# Patient Record
Sex: MALE | Race: WHITE | NOT HISPANIC OR LATINO | Employment: UNEMPLOYED | URBAN - METROPOLITAN AREA
[De-identification: names, ages, dates, MRNs, and addresses within clinical notes are randomized per-mention and may not be internally consistent; named-entity substitution may affect disease eponyms.]

---

## 2017-05-09 ENCOUNTER — NON-PROVIDER VISIT (OUTPATIENT)
Dept: URGENT CARE | Facility: PHYSICIAN GROUP | Age: 52
End: 2017-05-09

## 2017-05-09 DIAGNOSIS — Z02.1 PRE-EMPLOYMENT DRUG SCREENING: ICD-10-CM

## 2017-05-09 LAB
AMP AMPHETAMINE: NORMAL
COC COCAINE: NORMAL
INT CON NEG: NEGATIVE
INT CON POS: POSITIVE
MET METHAMPHETAMINES: NORMAL
OPI OPIATES: NORMAL
PCP PHENCYCLIDINE: NORMAL
POC DRUG COMMENT 753798-OCCUPATIONAL HEALTH: NEGATIVE
THC: NORMAL

## 2017-05-09 PROCEDURE — 80305 DRUG TEST PRSMV DIR OPT OBS: CPT | Performed by: PHYSICIAN ASSISTANT

## 2017-05-09 NOTE — PATIENT INSTRUCTIONS
Franco Rj Steiner is a 52 y.o. male here for a non-provider visit for uds    If abnormal was an in office provider notified today (if so, indicate provider)? No  Routed to PCP? No

## 2017-05-09 NOTE — MR AVS SNAPSHOT
Franco Steiner   2017 9:00 AM   Non-Provider Visit   MRN: [id number]    Department:  [facility]   Dept Phone:  [phone]    Description:  Male : 1965   [facility]           Reason for Visit     Drug / Alcohol Assessment           Allergies as of 2017     No Known Allergies      You were diagnosed with     Pre-employment drug screening   [[id number]]         Vital Signs     Smoking Status                   Current Every Day Smoker           Basic Information     Date Of Birth Sex Race Ethnicity Preferred Language    1965 Male White Non- English      Health Maintenance        Date Due Completion Dates    IMM DTaP/Tdap/Td Vaccine (1 - Tdap) 1/15/1985 ---    COLONOSCOPY 1/15/2016 ---            Results     POCT 6 Panel Urine Drug Screen      Component    AMPHETAMINE    POC THC    COCAINE    OPIATES    PHENCYCLIDINE    METHAMPHETAMINES    POC Urine Drug Screen Comment    negative    Internal Control Positive    Positive    Internal Control Negative    Negative                        Current Immunizations     No immunizations on file.      Below and/or attached are the medications your provider expects you to take. Review all of your home medications and newly ordered medications with your provider and/or pharmacist. Follow medication instructions as directed by your provider and/or pharmacist. Please keep your medication list with you and share with your provider. Update the information when medications are discontinued, doses are changed, or new medications (including over-the-counter products) are added; and carry medication information at all times in the event of emergency situations     Allergies:  No Known Allergies          Medications  Valid as of: May 09, 2017 -  1:34 PM    Generic Name Brand Name Tablet Size Instructions for use    Acetaminophen-Codeine (Tab) Acetaminophen-Codeine 300-30 MG Take 1-2 Tabs by mouth every 6 hours as needed.        Azithromycin (Tab) ZITHROMAX 250 MG TAKE TWO TABLETS BY MOUTH AS ONE DOSE ON THE FIRST DAY THEN TAKE ONE TABLET DAILY THEREAFTER        MethylPREDNISolone (Kit) MEDROL DOSEPACK 4 MG follow package directions        .                 Medicines prescribed today were sent to:     [id number] - [place] [address]    Phone: [phone] Fax: [phone]    Open 24 Hours?: No      Medication refill instructions:       If your prescription bottle indicates you have medication refills left, it is not necessary to call your provider’s office. Please contact your pharmacy and they will refill your medication.    If your prescription bottle indicates you do not have any refills left, you may request refills at any time through one of the following ways: The online [facility] [facility] system (except Urgent Care), by calling your provider’s office, or by asking your pharmacy to contact your provider’s office with a refill request. Medication refills are processed only during regular business hours and may not be available until the next business day. Your provider may request additional information or to have a follow-up visit with you prior to refilling your medication.   *Please Note: Medication refills are assigned a new Rx number when refilled electronically. Your pharmacy may indicate that no refills were authorized even though a new prescription for the same medication is available at the pharmacy. Please request the medicine by name with the pharmacy before contacting your provider for a refill.        Instructions    Franco Steiner is a 52 y.o. male here for a non-provider visit for uds    If abnormal was an in office provider notified today (if so, indicate provider)? No  Routed to PCP? No            [facility] [facility] Access Code: [email or web address]  Expires: 6/8/2017  1:34 PM    Your email address is not on file at [employer].  Email Addresses are required for you to sign up for [facility],  please contact [phone] to verify your personal information and to provide your email address prior to attempting to register for [employer].    Franco Steiner  [address] [place] [address]    [employer]  A secure, online tool to manage your health information     [employer]® is a secure, online tool that connects you to your personalized health information from the privacy of your home -- day or night - making it very easy for you to manage your healthcare. Once the activation process is completed, you can even access your medical information using the [employer] collin, which is available for free in the Apple Collin store or Google Play store.     To learn more about [employer], visit www.The Smart[employer] Physical/Arte Manifiestot    There are two levels of access available (as shown below):   My Chart Features  [facility] Primary Care Doctor [facility]  Specialists [facility]  Urgent  Care Non-[facility] Primary Care Doctor   Email your healthcare team securely and privately 24/7 X X X    Manage appointments: schedule your next appointment; view details of past/upcoming appointments X      Request prescription refills. X      View recent personal medical records, including lab and immunizations X X X X   View health record, including health history, allergies, medications X X X X   Read reports about your outpatient visits, procedures, consult and ER notes X X X X   See your discharge summary, which is a recap of your hospital and/or ER visit that includes your diagnosis, lab results, and care plan X X  X     How to register for [employer]:  Once your e-mail address has been verified, follow the following steps to sign up for [employer].     1. Go to  [email or web address]  2. Click on the Sign Up Now box, which takes you to the New Member Sign Up page. You will need to provide the following information:  a. Enter your [employer] Access Code exactly as it appears at the top of this page. (You will not need to use this code after you’ve completed the  sign-up process. If you do not sign up before the expiration date, you must request a new code.)   b. Enter your date of birth.   c. Enter your home email address.   d. Click Submit, and follow the next screen’s instructions.  3. Create a [employer] ID. This will be your [employer] login ID and cannot be changed, so think of one that is secure and easy to remember.  4. Create a [employer] password. You can change your password at any time.  5. Enter your Password Reset Question and Answer. This can be used at a later time if you forget your password.   6. Enter your e-mail address. This allows you to receive e-mail notifications when new information is available in [employer].  7. Click Sign Up. You can now view your health information.    For assistance activating your [employer] account, call [phone]

## 2020-05-25 ENCOUNTER — HOSPITAL ENCOUNTER (EMERGENCY)
Facility: MEDICAL CENTER | Age: 55
End: 2020-05-26
Attending: EMERGENCY MEDICINE
Payer: MEDICAID

## 2020-05-25 DIAGNOSIS — Z87.39 HISTORY OF GOUT: ICD-10-CM

## 2020-05-25 DIAGNOSIS — M79.641 RIGHT HAND PAIN: ICD-10-CM

## 2020-05-25 PROCEDURE — 99283 EMERGENCY DEPT VISIT LOW MDM: CPT

## 2020-05-25 SDOH — HEALTH STABILITY: MENTAL HEALTH: HOW MANY STANDARD DRINKS CONTAINING ALCOHOL DO YOU HAVE ON A TYPICAL DAY?: 1 OR 2

## 2020-05-25 SDOH — HEALTH STABILITY: MENTAL HEALTH: HOW OFTEN DO YOU HAVE 6 OR MORE DRINKS ON ONE OCCASION?: NOT ASKED

## 2020-05-25 SDOH — HEALTH STABILITY: MENTAL HEALTH: HOW OFTEN DO YOU HAVE A DRINK CONTAINING ALCOHOL?: 2-4 TIMES A MONTH

## 2020-05-26 ENCOUNTER — APPOINTMENT (OUTPATIENT)
Dept: RADIOLOGY | Facility: MEDICAL CENTER | Age: 55
End: 2020-05-26
Attending: EMERGENCY MEDICINE
Payer: MEDICAID

## 2020-05-26 VITALS
OXYGEN SATURATION: 94 % | RESPIRATION RATE: 17 BRPM | DIASTOLIC BLOOD PRESSURE: 110 MMHG | HEIGHT: 66 IN | TEMPERATURE: 98.5 F | BODY MASS INDEX: 24.11 KG/M2 | HEART RATE: 93 BPM | SYSTOLIC BLOOD PRESSURE: 145 MMHG | WEIGHT: 150 LBS

## 2020-05-26 PROCEDURE — 73130 X-RAY EXAM OF HAND: CPT | Mod: RT

## 2020-05-26 RX ORDER — CEPHALEXIN 500 MG/1
1000 CAPSULE ORAL 3 TIMES DAILY
Qty: 30 CAP | Refills: 0 | Status: SHIPPED | OUTPATIENT
Start: 2020-05-26 | End: 2020-05-31

## 2020-05-26 RX ORDER — INDOMETHACIN 50 MG/1
50 CAPSULE ORAL 3 TIMES DAILY
Qty: 60 CAP | Refills: 0 | Status: SHIPPED | OUTPATIENT
Start: 2020-05-26 | End: 2020-07-12

## 2020-05-26 NOTE — ED PROVIDER NOTES
"ED Provider Note    CHIEF COMPLAINT  Chief Complaint   Patient presents with   • Hand Swelling     moving things from storage and felt possible spider bite. R index, middle finger swelling.        HPI  Franco Steiner is a 55 y.o. male past medical history of gout who presents with right hand swelling.  Patient believes that he had a spider bite to the right hand while moving things 2 days ago.  He endorses pain and swelling which started in the right middle finger and now has extended into the hand.  He denies any associated fevers or vomiting.  He does state that he felt a little lightheaded and he does not know if this is associated with the spider bite.    REVIEW OF SYSTEMS  See HPI for further details.   Positive for right finger and hand swelling and pain  Negative for fevers, vomiting    PAST MEDICAL HISTORY   has a past medical history of Gout and Pacemaker.    SOCIAL HISTORY  Social History     Tobacco Use   • Smoking status: Current Every Day Smoker     Packs/day: 1.00   Substance and Sexual Activity   • Alcohol use: Yes     Frequency: 2-4 times a month     Drinks per session: 1 or 2     Comment: moderately   • Drug use: No   • Sexual activity: Not on file       SURGICAL HISTORY   has a past surgical history that includes pacemaker insertion; knee arthroscopy; amputation, toe; and tonsillectomy.    CURRENT MEDICATIONS  Home Medications    **Home medications have not yet been reviewed for this encounter**         ALLERGIES  No Known Allergies    PHYSICAL EXAM  VITAL SIGNS: /110   Pulse 93   Temp 36.9 °C (98.5 °F) (Temporal)   Resp 17   Ht 1.676 m (5' 6\")   Wt 68 kg (150 lb)   SpO2 94%   BMI 24.21 kg/m²    Constitutional: Nontoxic-appearing older male.  Alert in no apparent distress.  HENT: Normocephalic, Atraumatic. Bilateral external ears normal. Nose normal. Moist mucous membranes.  Neck: Supple, full range of motion.  Eyes: Pupils are equal and reactive. Conjunctiva normal.   Skin: Warm, Dry. " No rash.   Musculoskeletal: Right hand with notable swelling over the right PIP joint which appears more chronic in nature, possibly tophi from gout he has difficulty with flexion at the right third DIP joint which also seems to be chronic.  There is no overlying erythema or warmth.  He has no significant pain with range of motion of the fingers.  Neurologic: Alert and oriented. Moving all extremities spontaneously  Psychiatric: Affect normal, Mood normal. Appears appropriate and not intoxicated.       DIAGNOSTIC STUDIES      RADIOLOGY  Personally reviewed by me  DX-HAND 3+ RIGHT   Final Result      1.  No right 3rd finger fracture or dislocation.      2.  Minor erosive arthritic change of the right 3rd PIP joint.      3.  Severe erosive arthritic change involving the right 2nd and 4th DIP joints.            ED COURSE  Vitals:    05/25/20 2356 05/25/20 2357 05/26/20 0000 05/26/20 0101   BP: (!) 161/107  160/108 145/110   Pulse:  95 94 93   Resp:   18 17   Temp:    36.9 °C (98.5 °F)   TempSrc:    Temporal   SpO2:  96% 95% 94%   Weight:       Height:             Medications administered:  Medications - No data to display        MEDICAL DECISION MAKING  Patient with history of gout who presents with right hand swelling for the last 2 days.  He is hypertensive with otherwise reassuring vital signs on arrival.  Patient was concern for possible spider bite although I do not see any evidence of this on exam.  There is what appears to be chronic bony deformity to the fingers consistent with gout or arthritis.  He has no evidence of overlying erythema or warmth to suggest cellulitis or abscess.  He has good range of motion of his fingers without significant pain to suggest septic arthritis or flexor tenosynovitis.  X-ray shows signs of acute arthritic changes however no signs of fracture or dislocation.  I suspect that patient symptoms are more likely associated with gout flare.  He will be discharged home on indomethacin in  addition to antibiotics in case of mild overlying cellulitis. Patient understands plan of care and strict return precautions for changing or worsening symptoms.        IMPRESSION  (M79.641) Right hand pain  (Z87.39) History of gout    Disposition: Discharge home, stable condition  Results, diagnoses, and treatment options were discussed with the patient and/or family. Patient verbalized understanding of plan of care and strict return precautions prior to discharge.    Patient referred to primary care provider for monitoring and treatment of blood pressure.      Discharge Medication List as of 5/26/2020  1:19 AM      START taking these medications    Details   indomethacin (INDOCIN) 50 MG Cap Take 1 Cap by mouth 3 times a day., Disp-60 Cap,R-0, Print Rx Paper      cephALEXin (KEFLEX) 500 MG Cap Take 2 Caps by mouth 3 times a day for 5 days., Disp-30 Cap,R-0, Print Rx Paper               Electronically signed by: Monae Ward M.D., 5/26/2020 12:42 AM

## 2020-05-26 NOTE — ED TRIAGE NOTES
"Chief Complaint   Patient presents with   • Hand Swelling     moving things from storage and felt possible spider bite. R index, middle finger swelling.      Pt amb to triage with steady gait for above complaint. Pt reports he felt something bite his R hand yesterday and now knuckle on index and middle finger swollen. Reports history of gout but this was different, as is started right after the bite.   Pt is alert and oriented, speaking in full sentences, follows commands and responds appropriately to questions. Resp are even and unlabored. No behavioral indicators of pain.   Pt placed in lobby. Pt educated on triage process. Pt encouraged to alert staff for any changes.    Pulse (!) 107   Temp 36.2 °C (97.2 °F) (Oral)   Resp 20   Ht 1.676 m (5' 6\")   Wt 68 kg (150 lb)   SpO2 95%   BMI 24.21 kg/m²     "

## 2020-05-26 NOTE — ED NOTES
Discharge teaching and paperwork provided regarding arthritis and gout and all questions/concerns answered. VSS, hand assessment stable and PIV removed. Given information regarding abx Rx. Patient discharged to the care of self and ambulated out of the ED.

## 2020-05-26 NOTE — DISCHARGE INSTRUCTIONS
You were seen in the Emergency Department for hand pain.    X-ray shows signs of arthritic changes.  There is no signs of significant infection.    Please use anti-inflammatories and antibiotics as directed    Please follow up with your primary care physician.    Return to the Emergency Department with worsening pain, swelling, redness, or other concerns.

## 2020-06-10 ENCOUNTER — HOSPITAL ENCOUNTER (EMERGENCY)
Facility: MEDICAL CENTER | Age: 55
End: 2020-06-11
Attending: EMERGENCY MEDICINE
Payer: MEDICAID

## 2020-06-10 ENCOUNTER — APPOINTMENT (OUTPATIENT)
Dept: RADIOLOGY | Facility: MEDICAL CENTER | Age: 55
End: 2020-06-10
Attending: EMERGENCY MEDICINE
Payer: MEDICAID

## 2020-06-10 DIAGNOSIS — R06.02 SOB (SHORTNESS OF BREATH): ICD-10-CM

## 2020-06-10 DIAGNOSIS — Z71.89 ADVICE GIVEN ABOUT 2019 NOVEL CORONAVIRUS INFECTION: ICD-10-CM

## 2020-06-10 DIAGNOSIS — I10 HYPERTENSION, UNSPECIFIED TYPE: ICD-10-CM

## 2020-06-10 LAB
ALBUMIN SERPL BCP-MCNC: 4.2 G/DL (ref 3.2–4.9)
ALBUMIN/GLOB SERPL: 1.4 G/DL
ALP SERPL-CCNC: 138 U/L (ref 30–99)
ALT SERPL-CCNC: 49 U/L (ref 2–50)
ANION GAP SERPL CALC-SCNC: 10 MMOL/L (ref 7–16)
AST SERPL-CCNC: 35 U/L (ref 12–45)
BASOPHILS # BLD AUTO: 0.7 % (ref 0–1.8)
BASOPHILS # BLD: 0.13 K/UL (ref 0–0.12)
BILIRUB SERPL-MCNC: 0.4 MG/DL (ref 0.1–1.5)
BUN SERPL-MCNC: 24 MG/DL (ref 8–22)
CALCIUM SERPL-MCNC: 9.6 MG/DL (ref 8.5–10.5)
CHLORIDE SERPL-SCNC: 108 MMOL/L (ref 96–112)
CO2 SERPL-SCNC: 15 MMOL/L (ref 20–33)
CREAT SERPL-MCNC: 1.03 MG/DL (ref 0.5–1.4)
D DIMER PPP IA.FEU-MCNC: 1.41 UG/ML (FEU) (ref 0–0.5)
EOSINOPHIL # BLD AUTO: 0.11 K/UL (ref 0–0.51)
EOSINOPHIL NFR BLD: 0.6 % (ref 0–6.9)
ERYTHROCYTE [DISTWIDTH] IN BLOOD BY AUTOMATED COUNT: 45.3 FL (ref 35.9–50)
GLOBULIN SER CALC-MCNC: 3.1 G/DL (ref 1.9–3.5)
GLUCOSE SERPL-MCNC: 124 MG/DL (ref 65–99)
HCT VFR BLD AUTO: 53.3 % (ref 42–52)
HGB BLD-MCNC: 17 G/DL (ref 14–18)
IMM GRANULOCYTES # BLD AUTO: 0.2 K/UL (ref 0–0.11)
IMM GRANULOCYTES NFR BLD AUTO: 1.1 % (ref 0–0.9)
LIPASE SERPL-CCNC: 60 U/L (ref 11–82)
LYMPHOCYTES # BLD AUTO: 1.3 K/UL (ref 1–4.8)
LYMPHOCYTES NFR BLD: 6.8 % (ref 22–41)
MCH RBC QN AUTO: 28.4 PG (ref 27–33)
MCHC RBC AUTO-ENTMCNC: 31.9 G/DL (ref 33.7–35.3)
MCV RBC AUTO: 89.1 FL (ref 81.4–97.8)
MONOCYTES # BLD AUTO: 0.36 K/UL (ref 0–0.85)
MONOCYTES NFR BLD AUTO: 1.9 % (ref 0–13.4)
NEUTROPHILS # BLD AUTO: 16.89 K/UL (ref 1.82–7.42)
NEUTROPHILS NFR BLD: 88.9 % (ref 44–72)
NRBC # BLD AUTO: 0 K/UL
NRBC BLD-RTO: 0 /100 WBC
PLATELET # BLD AUTO: 375 K/UL (ref 164–446)
PMV BLD AUTO: 9.9 FL (ref 9–12.9)
POTASSIUM SERPL-SCNC: 5.1 MMOL/L (ref 3.6–5.5)
PROT SERPL-MCNC: 7.3 G/DL (ref 6–8.2)
RBC # BLD AUTO: 5.98 M/UL (ref 4.7–6.1)
SODIUM SERPL-SCNC: 133 MMOL/L (ref 135–145)
TROPONIN T SERPL-MCNC: 13 NG/L (ref 6–19)
WBC # BLD AUTO: 19 K/UL (ref 4.8–10.8)

## 2020-06-10 PROCEDURE — A9270 NON-COVERED ITEM OR SERVICE: HCPCS | Performed by: EMERGENCY MEDICINE

## 2020-06-10 PROCEDURE — 83690 ASSAY OF LIPASE: CPT

## 2020-06-10 PROCEDURE — 84484 ASSAY OF TROPONIN QUANT: CPT

## 2020-06-10 PROCEDURE — 99284 EMERGENCY DEPT VISIT MOD MDM: CPT

## 2020-06-10 PROCEDURE — 85379 FIBRIN DEGRADATION QUANT: CPT

## 2020-06-10 PROCEDURE — 700102 HCHG RX REV CODE 250 W/ 637 OVERRIDE(OP): Performed by: EMERGENCY MEDICINE

## 2020-06-10 PROCEDURE — 700111 HCHG RX REV CODE 636 W/ 250 OVERRIDE (IP): Performed by: EMERGENCY MEDICINE

## 2020-06-10 PROCEDURE — 80053 COMPREHEN METABOLIC PANEL: CPT

## 2020-06-10 PROCEDURE — 93005 ELECTROCARDIOGRAM TRACING: CPT

## 2020-06-10 PROCEDURE — 93005 ELECTROCARDIOGRAM TRACING: CPT | Performed by: EMERGENCY MEDICINE

## 2020-06-10 PROCEDURE — 85025 COMPLETE CBC W/AUTO DIFF WBC: CPT

## 2020-06-10 RX ORDER — FAMOTIDINE 20 MG/1
20 TABLET, FILM COATED ORAL ONCE
Status: COMPLETED | OUTPATIENT
Start: 2020-06-10 | End: 2020-06-10

## 2020-06-10 RX ORDER — PREDNISONE 20 MG/1
40 TABLET ORAL ONCE
Status: COMPLETED | OUTPATIENT
Start: 2020-06-10 | End: 2020-06-10

## 2020-06-10 RX ORDER — DIPHENHYDRAMINE HCL 25 MG
25 TABLET ORAL ONCE
Status: COMPLETED | OUTPATIENT
Start: 2020-06-10 | End: 2020-06-10

## 2020-06-10 RX ADMIN — FAMOTIDINE 20 MG: 20 TABLET, FILM COATED ORAL at 22:59

## 2020-06-10 RX ADMIN — PREDNISONE 40 MG: 20 TABLET ORAL at 23:00

## 2020-06-10 RX ADMIN — DIPHENHYDRAMINE HYDROCHLORIDE 25 MG: 25 TABLET ORAL at 22:59

## 2020-06-10 NOTE — LETTER
6/16/2020               Franco Steiner  [address]  [facility] [address]        Dear Franco (MR#[id number])    This letter is to inform you that your COVID-19 test result is NEGATIVE.  This means that the virus that causes COVID-19 was not found in your sample.      A review of your test during your recent visit requires that we notify you of the following:  Your nasal swab was negative for the novel coronavirus (COVID-19). You are encouraged to stay at home until you have had no fever for 72 hours without the use of fever reducing medications and your symptoms are improving. There is no need for further self-quarantine for 14 days for COVID-19 unless otherwise directed by the Health Department.     For any further questions regarding COVID-19, please contact the [facility] at [phone].  Thank you for your cooperation in the matter.    Sincerely,  ED Culture Follow-Up Staff  Arnel Clemons, PharmD, PharmD    [facility], Emergency Department  [address] [place] [address] [id number] [phone] (ED Culture Line)  [phone]

## 2020-06-11 ENCOUNTER — APPOINTMENT (OUTPATIENT)
Dept: RADIOLOGY | Facility: MEDICAL CENTER | Age: 55
End: 2020-06-11
Attending: EMERGENCY MEDICINE
Payer: MEDICAID

## 2020-06-11 VITALS
OXYGEN SATURATION: 91 % | SYSTOLIC BLOOD PRESSURE: 168 MMHG | HEIGHT: 66 IN | RESPIRATION RATE: 18 BRPM | TEMPERATURE: 97.4 F | HEART RATE: 70 BPM | DIASTOLIC BLOOD PRESSURE: 105 MMHG | BODY MASS INDEX: 23.63 KG/M2 | WEIGHT: 147.05 LBS

## 2020-06-11 LAB
COVID ORDER STATUS COVID19: NORMAL
EKG IMPRESSION: NORMAL
TROPONIN T SERPL-MCNC: 10 NG/L (ref 6–19)

## 2020-06-11 PROCEDURE — 700117 HCHG RX CONTRAST REV CODE 255: Performed by: EMERGENCY MEDICINE

## 2020-06-11 PROCEDURE — 302875 HCHG BANDAGE ACE 4 OR 6""

## 2020-06-11 PROCEDURE — 302874 HCHG BANDAGE ACE 2 OR 3""

## 2020-06-11 PROCEDURE — 29125 APPL SHORT ARM SPLINT STATIC: CPT

## 2020-06-11 PROCEDURE — 71275 CT ANGIOGRAPHY CHEST: CPT

## 2020-06-11 PROCEDURE — 84484 ASSAY OF TROPONIN QUANT: CPT

## 2020-06-11 PROCEDURE — C9803 HOPD COVID-19 SPEC COLLECT: HCPCS | Performed by: EMERGENCY MEDICINE

## 2020-06-11 RX ADMIN — IOHEXOL 50 ML: 350 INJECTION, SOLUTION INTRAVENOUS at 03:45

## 2020-06-11 NOTE — ED NOTES
covid swab sent. Pt discharged home. Pt verbalized understand of discharge and medication instructions, all questions addressed. Pt advised to follow-up with PCP or return to ED for any new or worsening of symptoms. Pt is ambulating well and steady on feet. VSS. PIV removed.

## 2020-06-11 NOTE — ED PROVIDER NOTES
ED Provider Note      Means of Arrival: Private vehicle  History obtained from: Patient      CHIEF COMPLAINT  Chief Complaint   Patient presents with   • Shortness of Breath   • Allergic Reaction       HPI  Franco Steiner is a 55 y.o. male who presents with shortness of breath, report of extremity swelling.  Patient states that approximately 2:00 he ate fish.  Shortly thereafter he began having shortness of breath followed by swelling of his arms, elbows, knees.  He denies any pain with this.  Denies any chest pain, lightheadedness, fevers, cough.  He denies any known allergies to fish.  Denies any rash.  Denies any nausea or vomiting.  His primary complaint is shortness of breath.    REVIEW OF SYSTEMS  CONSTITUTIONAL:  No fever.  CARDIOVASCULAR:  No chest discomfort.  RESPIRATORY:   See HPI  GASTROINTESTINAL:  No abdominal pain.  GENITOURINARY:   No dysuria.  MUSCULOSKELETAL:  No arthralgia.  SKIN:  No rash or suspicious lesions.  NEUROLOGIC:   No headache.  ENDOCRINE:  No facial edema.  HEMATOLOGIC:  No abnormal bleeding.       See HPI for further details.   All other systems are negative.     PAST MEDICAL HISTORY  Past Medical History:   Diagnosis Date   • Gout    • Pacemaker        FAMILY HISTORY  Family History   Problem Relation Age of Onset   • Cancer Mother         lung primary   • Cancer Father         lung?   • Diabetes Maternal Grandmother    • Heart Attack Maternal Grandfather    • Stroke Neg Hx        SOCIAL HISTORY   reports that he has been smoking. He has been smoking about 1.00 pack per day. He has never used smokeless tobacco. He reports current alcohol use. He reports that he does not use drugs.    SURGICAL HISTORY  Past Surgical History:   Procedure Laterality Date   • AMPUTATION, TOE      left  foot   • KNEE ARTHROSCOPY      RIGHT   • PACEMAKER INSERTION     • TONSILLECTOMY         CURRENT MEDICATIONS  Home Medications     Reviewed by Sasha Cummings R.N. (Registered Nurse) on 06/10/20 at 2206  " Med List Status: Complete   Medication Last Dose Status   Acetaminophen-Codeine 300-30 MG TABS  Active   azithromycin (ZITHROMAX) 250 MG TABS  Active   indomethacin (INDOCIN) 50 MG Cap prn Active   methylPREDNISolone (MEDROL DOSEPACK) 4 MG tablet  Active                ALLERGIES  No Known Allergies    PHYSICAL EXAM  VITAL SIGNS: BP (!) 168/105   Pulse 70   Temp 36.3 °C (97.4 °F) (Temporal)   Resp 18   Ht 1.676 m (5' 6\")   Wt 66.7 kg (147 lb 0.8 oz)   SpO2 91%   BMI 23.73 kg/m²    Gen: Alert  HENT: ATNC, normal oropharynx, no swelling  Eyes: Normal conjunctiva  Neck: trachea midline  Resp: no respiratory distress, clear to auscultation bilateral  CV: No JVD, regular rate and rhythm, no murmurs, rubs, gallops  Abd: non-distended, nontender   Ext: No deformities, no pedal edema  Psych: normal mood  Neuro: speech fluent   Skin: No rash    RADIOLOGY/PROCEDURES  CT-CTA CHEST PULMONARY ARTERY W/ RECONS   Final Result         1.  No pulmonary embolus appreciated.   2.  Increased number of mediastinal lymph nodes, none which appear pathologically enlarged by CT size criteria.   3.  Hepatomegaly          LABS  Labs Reviewed   CBC WITH DIFFERENTIAL - Abnormal; Notable for the following components:       Result Value    WBC 19.0 (*)     Hematocrit 53.3 (*)     MCHC 31.9 (*)     Neutrophils-Polys 88.90 (*)     Lymphocytes 6.80 (*)     Immature Granulocytes 1.10 (*)     Neutrophils (Absolute) 16.89 (*)     Baso (Absolute) 0.13 (*)     Immature Granulocytes (abs) 0.20 (*)     All other components within normal limits   COMP METABOLIC PANEL - Abnormal; Notable for the following components:    Sodium 133 (*)     Co2 15 (*)     Glucose 124 (*)     Bun 24 (*)     Alkaline Phosphatase 138 (*)     All other components within normal limits   D-DIMER - Abnormal; Notable for the following components:    D-Dimer Screen 1.41 (*)     All other components within normal limits   TROPONIN   LIPASE   ESTIMATED GFR   TROPONIN   COVID/SARS " COV-2        EKG  Results for orders placed or performed during the hospital encounter of 06/10/20   EKG   Result Value Ref Range    Report       [facility] Emergency Dept.    Test Date:  2020-06-10  Pt Name:    HUNTER DEVRIES                   Department: ER  MRN:        [id number]                      Room:  Gender:     Male                         Technician: [id number]  :        [id number]                   Requested By:ER TRIAGE PROTOCOL  Order #:    [id number]                    Reading MD: Sam Lamb    Measurements  Intervals                                Axis  Rate:       75                           P:  LA:         168                          QRS:        121  QRSD:       94                           T:          14  QT:         388  QTc:        434    Interpretive Statements  ATRIAL-PACED RHYTHM  LEFT POSTERIOR FASCICULAR BLOCK  No previous ECG available for comparison  Electronically Signed On 2020 1:21:50 PDT by Sam Lamb          COURSE & MEDICAL DECISION MAKING  Pertinent Labs & Imaging studies reviewed. (See chart for details)    Patient presents with shortness of breath in the setting of recently eating fish.  We will treat empirically for allergic reaction, however I doubt this is the true etiology.  He demonstrates no obvious signs of allergic reaction, such as hives, oropharyngeal swelling, wheezing.  His lung sounds are clear, low suspicion for COPD or asthma.  Will obtain EKG, labs to evaluate for ACS.  Patient does have a markedly elevated d-dimer, will obtain a CT PE to evaluate for possible pulmonary embolism.  Benign abdominal exam, low suspicion for intra-abdominal infection.    CT PE reassuring.  No evidence of acute chest pathology.  Patient is feeling improved.  Patient will be discharged home and given return precautions as well as anticipatory guidance.  Will obtain coronavirus testing for possible atypical presentation.  The ED  was contacted and a message  was left to arrange for primary care follow-up.  Patient has been hemodynamically stable while in the emergency department.      Appropriate PPE were worn at this encounter.     FINAL IMPRESSION  1. SOB (shortness of breath)    2. Hypertension, unspecified type    3. Advice given about 2019 novel coronavirus infection             DISPOSITION:  Patient will be discharged home in stable condition.    FOLLOW UP:  Your regular doctor.  To establish a primary care provider within our system, please call [phone]          [facility], Emergency Dept  [address] [place] [id number] [phone]    If symptoms worsen      OUTPATIENT MEDICATIONS:  New Prescriptions    No medications on file

## 2020-06-11 NOTE — ED TRIAGE NOTES
"Chief Complaint   Patient presents with   • Shortness of Breath   • Allergic Reaction       Patient ambulatory to triage with above complaint. States that he ate fish today at 1400 and became SOB shortly after eating the fish. Patient's lungs sound clear and patient able to speak in full sentences. RR 24. Patient is A+Ox4. Denies any allergies to fish.   Blood Pressure: (!) 176/111, Pulse: 74, Respiration: 20, Temperature: 36.6 °C (97.8 °F), Height: 167.6 cm (5' 6\"), Weight: 66.7 kg (147 lb 0.8 oz), BMI (Calculated): 23.73, BSA (Calculated): 1.8, Pulse Oximetry: 96 %        Charge updated on patient.   "

## 2020-06-11 NOTE — DISCHARGE INSTRUCTIONS
He was seen in the emergency department for shortness of breath.  Your symptoms do not appear to be from an allergic reaction.  Your work-up shows no signs of heart attack, blood clot in the lungs, pneumonia, dropped lung, or other dangerous findings.  The cause of your symptoms is not clear, however at this time does not appear to be dangerous.  Please follow-up with your regular doctor.  You are also being given the referral number if you do not have a primary care physician.    Please return to the emergency department if your symptoms do not resolve in 24 hours, you have worsening symptoms, or you develop new symptoms, such as fever, chest pain, abdominal pain, vomiting    ================================  Coronavirus Information    You symptoms may be from Coronavirus, however we do not have testing results available at this time.  Results have been sent and you should receive follow-up in 2 to 3 days.  Please isolate yourself at home. Please contact [facility] hotline (or your local health department)  or your healthcare provider before going to a medical facility:    [facility]  24hr hotline: [phone]      Information is available from the Centers for Disease Control and Prevention  www.CDC.gov    and     [facility]  [email or web address]    You will need to remain in home isolation until all 3 of the following are true:    1.  At least 10 days have passed since your first symptoms    2.  Your symptoms are improving    3.  You have not had a fever for at least 72 hours.    If you are severely ill or having a hard time breathing (cannot walk short distances), please immediately seek medical care. Notify the  or Emergency Department Triage about your symptoms.

## 2020-06-15 LAB
SARS-COV-2 RNA RESP QL NAA+PROBE: NOT DETECTED
SPECIMEN SOURCE: NORMAL

## 2020-06-16 NOTE — ED NOTES
ED Positive Culture Follow-up/Notification Note:    Date: 6/16/20     Patient seen in the ED on 6/10/2020 for SOB and extremity swelling that occurred after eating fish at 0200. The pt denied any fevers or cough.      1. SOB (shortness of breath)    2. Hypertension, unspecified type    3. Advice given about 2019 novel coronavirus infection       Discharge Medication List as of 6/11/2020  3:53 AM        Medications given in the ED:  -Diphenhydramine 25 mg PO once  -Famotidine 20 mg tablet PO once  -Prednisone 40 mg PO once    Allergies: Patient has no known allergies.     Vitals:    06/11/20 0146 06/11/20 0154 06/11/20 0200 06/11/20 0300   BP: (!) 181/123 (!) 178/117  (!) 168/105   Pulse:   66 70   Resp:    18   Temp:    36.3 °C (97.4 °F)   TempSrc:    Temporal   SpO2:   92% 91%   Weight:       Height:           Final cultures:   Results     Procedure Component Value Units Date/Time    COVID/SARS CoV-2 [[id number]] Collected:  06/11/20 0405    Order Status:  Completed Specimen:  Respirate from Nasopharyngeal Updated:  06/11/20 0410     COVID Order Status Received    Narrative:       Send out  Is this test for diagnosis or screening?->Diagnosis of ill  patient        Results for HUNTER DEVRIES (MRN [id number]) as of 6/16/2020 08:07   Ref. Range 6/11/2020 04:05   COVID Order Status Unknown Received   2019-nCoV RNA Interp Unknown Not detected   2019-nCoV Source Unknown NP Swab     Plan:   COVID-19 Test Follow Up  06/16/20      Patient tested negative for COVID-19. Attempted to inform patient of result, but there was no answer. Left a message to call for results. If they return the call, I will discuss staying at home until no fever for 72 hours without the use of fever reducing medications and symptoms improving. Informed there is no need for further self-isolatation for 14 days for COVID-19 unless otherwise directed by the Health Dept. I have also sent them a letter informing them of results and with counseling points  above.    They are advised to return to the ER for worsening symptoms including difficulty breathing, ongoing fever, weakness or chest pain.      Arnel Clemons, PharmMICHELLE        Addendum 6/16/20@5869:    The pt called back and I informed him of results and counseled him on points above. He stated understanding and had no further questions.    Arnel Clemons, PharmD

## 2020-07-12 ENCOUNTER — OFFICE VISIT (OUTPATIENT)
Dept: URGENT CARE | Facility: CLINIC | Age: 55
End: 2020-07-12
Payer: MEDICAID

## 2020-07-12 VITALS
OXYGEN SATURATION: 96 % | DIASTOLIC BLOOD PRESSURE: 62 MMHG | HEIGHT: 66 IN | WEIGHT: 150 LBS | SYSTOLIC BLOOD PRESSURE: 120 MMHG | TEMPERATURE: 96.9 F | HEART RATE: 88 BPM | BODY MASS INDEX: 24.11 KG/M2

## 2020-07-12 DIAGNOSIS — M10.9 ACUTE GOUT OF RIGHT HAND, UNSPECIFIED CAUSE: ICD-10-CM

## 2020-07-12 DIAGNOSIS — M10.9 ACUTE GOUT OF LEFT HAND, UNSPECIFIED CAUSE: ICD-10-CM

## 2020-07-12 PROCEDURE — 99204 OFFICE O/P NEW MOD 45 MIN: CPT | Performed by: FAMILY MEDICINE

## 2020-07-12 RX ORDER — KETOROLAC TROMETHAMINE 30 MG/ML
60 INJECTION, SOLUTION INTRAMUSCULAR; INTRAVENOUS ONCE
Status: COMPLETED | OUTPATIENT
Start: 2020-07-12 | End: 2020-07-12

## 2020-07-12 RX ORDER — METHYLPREDNISOLONE 4 MG/1
TABLET ORAL
Qty: 21 TAB | Refills: 0 | Status: SHIPPED | OUTPATIENT
Start: 2020-07-12

## 2020-07-12 RX ADMIN — KETOROLAC TROMETHAMINE 60 MG: 30 INJECTION, SOLUTION INTRAMUSCULAR; INTRAVENOUS at 17:02

## 2020-07-12 ASSESSMENT — FIBROSIS 4 INDEX: FIB4 SCORE: .72

## 2020-07-12 NOTE — PROGRESS NOTES
Chief Complaint:    Chief Complaint   Patient presents with   • Gout       History of Present Illness:    This is a new problem. He has 20 year history of gout and in the past week, he feels the gout has flared to affect both hands. Problem is now severe and not getting better. No injury or trauma. He reports he is beyond the point that NSAIDs will help and feels Medrol Dose Nima will be very helpful as this has helped in the past.      Review of Systems:    Constitutional: Negative for fever, chills, and diaphoresis.   Eyes: Negative for change in vision, photophobia, pain, redness, and discharge.  ENT: Negative for ear pain, ear discharge, hearing loss, tinnitus, nasal congestion, nosebleeds, and sore throat.    Respiratory: Negative for cough, hemoptysis, sputum production, shortness of breath, wheezing, and stridor.    Cardiovascular: Negative for chest pain, palpitations, orthopnea, claudication, leg swelling, and PND.   Gastrointestinal: Negative for abdominal pain, nausea, vomiting, diarrhea, constipation, blood in stool, and melena.   Genitourinary: Negative for dysuria, urinary urgency, urinary frequency, hematuria, and flank pain.   Musculoskeletal: See HPI.   Skin: Negative for rash and itching.   Neurological: Negative for dizziness, tingling, tremors, sensory change, speech change, focal weakness, seizures, loss of consciousness, and headaches.   Endo: Negative for polydipsia.   Heme: Does not bruise/bleed easily.   Psychiatric/Behavioral: Negative for depression, suicidal ideas, hallucinations, memory loss and substance abuse. The patient is not nervous/anxious and does not have insomnia.        Past Medical History:    Past Medical History:   Diagnosis Date   • Gout    • Pacemaker      Past Surgical History:    Past Surgical History:   Procedure Laterality Date   • AMPUTATION, TOE      left  foot   • KNEE ARTHROSCOPY      RIGHT   • PACEMAKER INSERTION     • TONSILLECTOMY       Social History:    Social  History     Socioeconomic History   • Marital status:      Spouse name: Not on file   • Number of children: Not on file   • Years of education: Not on file   • Highest education level: Not on file   Occupational History   • Occupation:      Employer: OTHER   Social Needs   • Financial resource strain: Not on file   • Food insecurity     Worry: Not on file     Inability: Not on file   • Transportation needs     Medical: Not on file     Non-medical: Not on file   Tobacco Use   • Smoking status: Current Every Day Smoker     Packs/day: 1.00   • Smokeless tobacco: Never Used   Substance and Sexual Activity   • Alcohol use: Yes     Frequency: 2-4 times a month     Drinks per session: 1 or 2     Comment: moderately   • Drug use: No   • Sexual activity: Not on file   Lifestyle   • Physical activity     Days per week: Not on file     Minutes per session: Not on file   • Stress: Not on file   Relationships   • Social connections     Talks on phone: Not on file     Gets together: Not on file     Attends [employer] service: Not on file     Active member of club or organization: Not on file     Attends meetings of clubs or organizations: Not on file     Relationship status: Not on file   • Intimate partner violence     Fear of current or ex partner: Not on file     Emotionally abused: Not on file     Physically abused: Not on file     Forced sexual activity: Not on file   Other Topics Concern   • Not on file   Social History Narrative   • Not on file     Family History:    Family History   Problem Relation Age of Onset   • Cancer Mother         lung primary   • Cancer Father         lung?   • Diabetes Maternal Grandmother    • Heart Attack Maternal Grandfather    • Stroke Neg Hx      Medications:    No current outpatient medications on file prior to visit.     No current facility-administered medications on file prior to visit.      Allergies:    No Known Allergies      Vitals:    Vitals:    07/12/20 1642  "  BP: 120/62   Pulse: 88   Temp: 36.1 °C (96.9 °F)   TempSrc: Temporal   SpO2: 96%   Weight: 68 kg (150 lb)   Height: 1.676 m (5' 6\")       Physical Exam:    Constitutional: Vital signs reviewed. Appears well-developed and well-nourished. No acute distress.   Eyes: Sclera white, conjunctivae clear.  ENT: External ears normal. Hearing normal.  Cardiovascular: Peripheral pulses 2+.   Pulmonary/Chest: Respirations non-labored.  Musculoskeletal: Both hands have erythema, swelling, tenderness to palpation, and decreased range of motion in the fingers.  Neurological: Alert and oriented to person, place, and time. Muscle tone normal. Coordination normal. Light touch and sensation normal.   Skin: See above.  Psychiatric: Normal mood and affect. Behavior is normal. Judgment and thought content normal.       Assessment / Plan:    1. Acute gout of right hand, unspecified cause  - ketorolac (TORADOL) injection 60 mg  - methylPREDNISolone (MEDROL DOSEPAK) 4 MG Tablet Therapy Pack; TAKE AS DIRECTED ON PACKAGE.  Dispense: 21 Tab; Refill: 0    2. Acute gout of left hand, unspecified cause  - ketorolac (TORADOL) injection 60 mg  - methylPREDNISolone (MEDROL DOSEPAK) 4 MG Tablet Therapy Pack; TAKE AS DIRECTED ON PACKAGE.  Dispense: 21 Tab; Refill: 0      Discussed with him DDX, management options, and risks, benefits, and alternatives to treatment plan agreed upon.    Agreeable to potent anti-inflammatory treatment with medications given and prescribed.    Discussed expected course of duration, time for improvement, and to seek follow-up in Emergency Room, urgent care, or with PCP if getting worse at any time or not improving within expected time frame.    "